# Patient Record
(demographics unavailable — no encounter records)

---

## 2024-11-13 NOTE — HISTORY OF PRESENT ILLNESS
[Spouse] : spouse [Family Member] : family member [FreeTextEntry1] : This visit was provided via telehealth using real-time 2-way audio visual technology. The patient, ELIE BEST was located at home, [address] [place] [address], at the time of the visit.   The provider, Jeevan SOLITARIO, was located at [facility] at the time of the visit.  The patient, ELIE BEST and provider participated in the telehealth encounter.  Verbal consent for telehealth services was given at time prior to the start of visit.  [de-identified] : HFI. This is ELIE BEST 73 year English, F, presented for virtual visit as stated above.  Recently reported vitals in flow sheet. Medication reconciliation performed.  The patient reported h/o: T2DM, HTN, HLD, obesity, carpal tunnel syndrome, crohn disease, djd, gerd, low, right shoulder surgery s/p fall 12/2023, neuropathy,  BMI 34.45, PCP: Kelvin Valles. recent labs 06/24: A1C 8.1, Bun 18, Cret 0.96, eGFR 63, Glu 134 POCT 109. ELIE BEST, is seen via telecommunication as stated above, appears pleasant and in nad.  Spouse is present.  AAO (see PE). Patient denies any feeling of depression, and HI/SI. Patient denies any f/c, sob, cp, dizziness, lightheadedness, abnormal bruising/bleeding, n/v/d, or abdominal pain.  Mental Status: No change in mental status noted.   Cognitive Impairment Screening: denies difficulty with language, denies difficulty with behavior, denies difficulty learning/retaining new information, denies difficulty handling complex tasks, denies difficulty with reasoning and denies difficulty with spatial ability and orientation.   Barriers To Care: None.   Living Situation: lives with significant other.   Employment Status: retired.   Education: less than high school.   Relationship Status:  and has children.   Sexual History: not sexually active.   Home Environment: Feels safe at home.   ADLs: Fully functional (bathing, dressing, toileting, transferring, walking, feeding).   IADLs: Fully functional and needs no help or supervision to perform IADLs (using the telephone, shopping, preparing meals, housekeeping, doing laundry, using transportation, managing medications and managing finances).   Hearing: Reports no changes in hearing.   Vision: Reports no changes in vision. Reports normal functional visual acuity.   Dental Health: Reports no changes in dental health.   Home Safety: smoke detector, carbon monoxide detector and safety elements used in home.   General Safety Concerns:  uses seat belt, uses sunscreen, but does not travel to developing areas, is not being exposed to tuberculosis, does not have caregiver concerns.

## 2024-11-13 NOTE — PLAN
[FreeTextEntry1] : Patient seen in home, enforced w/ pt the need for daily weight/bp monitoring/blood glucose monitoring, low salt diet and educated pt to avoid processed/canned food and limit take out, increase vegetable/fiber and fruit intake (portion control).  Daily exercise w/ easy to reach realistic goals.  Continue w/ current regimen and medication as ordered.  Adhere to follow up w/ pcp/endo/opth/nephro/dpm. Pt advised to call with any questions/concerns.    Total time spent w/ patient 60mins. Discussed POCT monitoring with pt >10 mins.  Depression screening time spent >15 mins.

## 2024-11-13 NOTE — HEALTH RISK ASSESSMENT
[No] : No [Any fall with injury in past year] : Patient reported fall with injury in the past year [Little interest or pleasure doing things] : 1) Little interest or pleasure doing things [Feeling down, depressed, or hopeless] : 2) Feeling down, depressed, or hopeless [0] : 2) Feeling down, depressed, or hopeless: Not at all (0) [PHQ-2 Negative - No further assessment needed] : PHQ-2 Negative - No further assessment needed [Not at All (0)] : 9.) Thoughts that you would be off dead or of hurting yourself in some way? Not at all [PHQ-9 Negative - No further assessment needed] : PHQ-9 Negative - No further assessment needed [I have developed a follow-up plan documented below in the note.] : I have developed a follow-up plan documented below in the note. [Time Spent: ___ Minutes] : I spent [unfilled] minutes performing a depression screening for this patient. [With Patient/Caregiver] : , with patient/caregiver [Designated Healthcare Proxy] : Designated healthcare proxy [Name: ___] : Health Care Proxy's Name: [unfilled]  [Relationship: ___] : Relationship: [unfilled] [I will adhere to the patient's wishes.] : I will adhere to the patient's wishes. [Time Spent: ___ minutes] : Time Spent: [unfilled] minutes [Never] : Never [Audit-CScore] : 0 [[id number]] : 0 [[id number]] : 0 [AdvancecareDate] : 11/24 [FreeTextEntry4] : hcp completed, content not disclosed.

## 2024-11-13 NOTE — PHYSICAL EXAM
[de-identified] : Telehealth precludes traditional, comprehensive physical exam. Patient appeared stable and alert.  virtual video visit w/ objective observation and subject reporting only.

## 2024-11-13 NOTE — REVIEW OF SYSTEMS
[Vision Problems] : vision problems [Joint Pain] : joint pain [Back Pain] : back pain [Negative] : Neurological [FreeTextEntry3] : w/ glasses [FreeTextEntry9] : bilateral knees pain

## 2025-03-11 NOTE — HISTORY OF PRESENT ILLNESS
[FreeTextEntry1] : Annual wellness, General health maintenance for this female of 73 years with history of anxiety depression obesity shoulder pain carpal tunnel hypertension hyperlipidemia impingement of the right shoulder.  CC: is always sweating and hot for the last few months. Gets headaches on her frontal lobe. Also states she has mucous on the right side of her throat, she will be seeing an ENT next week.  Patient has a rash on her toes.  Patient comes for diabetes management [de-identified] : 72 y/o F presents for an annual wellness. Pt has hx of htn, lipids, obesity, diabetes, covid-19 Hx of right shoulder pain, persists, post MRIL massive rotator cuff tear full thickness discontinuity of supraspinatus, infraspinatus and subscapularis. Significant edema within rotator cuff musculature. Bicep tendon complete tear of long head. Severe glenohumeral arthrosis and labrum tear.  Patient denies fever chills cough chest pain or shortness of breath  Voices no further complaints ROS as noted below Denies fever, cough, chills, body aches and SOB.

## 2025-03-11 NOTE — COUNSELING
[Fall prevention counseling provided] : Fall prevention counseling provided [Adequate lighting] : Adequate lighting [No throw rugs] : No throw rugs [Use proper foot wear] : Use proper foot wear [Use recommended devices] : Use recommended devices [Behavioral health counseling provided] : Behavioral health counseling provided [Sleep ___ hours/day] : Sleep [unfilled] hours/day [Engage in a relaxing activity] : Engage in a relaxing activity [Plan in advance] : Plan in advance [Potential consequences of obesity discussed] : Potential consequences of obesity discussed [Benefits of weight loss discussed] : Benefits of weight loss discussed [Structured Weight Management Program suggested:] : Structured weight management program suggested [Encouraged to maintain food diary] : Encouraged to maintain food diary [Encouraged to increase physical activity] : Encouraged to increase physical activity [Encouraged to use exercise tracking device] : Encouraged to use exercise tracking device [Target Wt Loss Goal ___] : Weight Loss Goals: Target weight loss goal [unfilled] lbs [Weigh Self Weekly] : weigh self weekly [Decrease Portions] : decrease portions [____ min/wk Activity] : [unfilled] min/wk activity [Keep Food Diary] : keep food diary [None] : None [Good understanding] : Patient has a good understanding of lifestyle changes and steps needed to achieve self management goal [FreeTextEntry4] : 10-minute [de-identified] : Total face-to-face time with patient - 20 minutes; >50% involved counselling, review of labs/tests, and/or coordination of medical care:  Medical Annual wellness visit completed: HRA completed and reviewed with patient Medical, family, surgical history reviewed with patient and updated List of current providers r/w patient and updated Vitals, BMI reviewed and discussed along with healthy BMI goals. Dietary counseling x 15 minutes provided Depression PHQ 9 completed and reviewed  Annual safety assessment reviewed discussed advanced directives smoking cessation counseling provided Established routine screening and immunization schedules  VACCINATION & OTHER TX RECOMMENDATIONS  ASA preventative therapy Calcium/Vitamin D supplementation   Dietary counseling, nutrition referral risks vs. benefits d/w patient. routine vaccination and vaccination schedules and recommendation d/w patient  Vaccines recommended:  * pneumovax (once after 65) & prevnar * annual Influenza vaccine * Hep B vaccines * zostavax * Tdap  Colorectal screening recommended; screening colonoscopy q10yr, flex sig q5yr, annual fecal occult testing BMD recommended biennially for osteoporosis screening Glaucoma screening recommended, annual optho evals Cardiovascular screening and blood tests recommended and discussed w/ patient, cholesterol screening and dietary counseling AAA recommended x 1  diet and exercise weight loss.  Low-salt low-fat ADA diet/ htn- Discussed diabetes physiology - Discussed importance of monitoring blood glucose levels - Encouraged a low fat/low cholesterol diet - Discussed symptoms of hyperglycemia and hypoglycemia - Discussed ADA glucose goals - Discussed  HGB A1c and the effects of blood glucose on the level - Discussed Healthy eating, avoidance of concentrated sweets, and to include vegetables by at least 2 meals a day - Discussed regular exercise - Discussed importance of follow up physician visits Limit intake of Sodium (Salt) to less than 2 grams a day to prevent fluid retention-swelling or worsening of symptoms. The importance of keeping the blood pressure at or below 130/80 to prevent stroke, heart attacks, kidney failure, blindness, and loss of limbs was  low chol diet. Avoid fried foods, red meat, butter, eggs, hard cheeses. Use canola or olive oil preferred. ::  was established in which goals would be set, monitoring would be done, and problem solving would also be addressed. The patient would be assisted using behavior change techniques, such as self-help and counseling through behavioral modification: Problem solving using hypnosis and positive medical reinforcement to achieve agreed-upon goals.

## 2025-03-11 NOTE — REVIEW OF SYSTEMS
[Negative] : Heme/Lymph [Recent Change In Weight] : ~T recent weight change [Nasal Discharge] : nasal discharge [Joint Pain] : joint pain [Muscle Pain] : muscle pain [Back Pain] : back pain [Skin Rash] : skin rash [Anxiety] : anxiety [FreeTextEntry4] : Postnasal drip, sore throat right side [FreeTextEntry2] : Patient has lost 7 pounds since last visit [de-identified] : Rash on feet

## 2025-03-11 NOTE — COUNSELING
[Fall prevention counseling provided] : Fall prevention counseling provided [Adequate lighting] : Adequate lighting [No throw rugs] : No throw rugs [Use proper foot wear] : Use proper foot wear [Use recommended devices] : Use recommended devices [Behavioral health counseling provided] : Behavioral health counseling provided [Sleep ___ hours/day] : Sleep [unfilled] hours/day [Engage in a relaxing activity] : Engage in a relaxing activity [Plan in advance] : Plan in advance [Potential consequences of obesity discussed] : Potential consequences of obesity discussed [Benefits of weight loss discussed] : Benefits of weight loss discussed [Structured Weight Management Program suggested:] : Structured weight management program suggested [Encouraged to maintain food diary] : Encouraged to maintain food diary [Encouraged to increase physical activity] : Encouraged to increase physical activity [Encouraged to use exercise tracking device] : Encouraged to use exercise tracking device [Target Wt Loss Goal ___] : Weight Loss Goals: Target weight loss goal [unfilled] lbs [Weigh Self Weekly] : weigh self weekly [Decrease Portions] : decrease portions [____ min/wk Activity] : [unfilled] min/wk activity [Keep Food Diary] : keep food diary [None] : None [Good understanding] : Patient has a good understanding of lifestyle changes and steps needed to achieve self management goal [FreeTextEntry4] : 10-minute [de-identified] : Total face-to-face time with patient - 20 minutes; >50% involved counselling, review of labs/tests, and/or coordination of medical care:  Medical Annual wellness visit completed: HRA completed and reviewed with patient Medical, family, surgical history reviewed with patient and updated List of current providers r/w patient and updated Vitals, BMI reviewed and discussed along with healthy BMI goals. Dietary counseling x 15 minutes provided Depression PHQ 9 completed and reviewed  Annual safety assessment reviewed discussed advanced directives smoking cessation counseling provided Established routine screening and immunization schedules  VACCINATION & OTHER TX RECOMMENDATIONS  ASA preventative therapy Calcium/Vitamin D supplementation   Dietary counseling, nutrition referral risks vs. benefits d/w patient. routine vaccination and vaccination schedules and recommendation d/w patient  Vaccines recommended:  * pneumovax (once after 65) & prevnar * annual Influenza vaccine * Hep B vaccines * zostavax * Tdap  Colorectal screening recommended; screening colonoscopy q10yr, flex sig q5yr, annual fecal occult testing BMD recommended biennially for osteoporosis screening Glaucoma screening recommended, annual optho evals Cardiovascular screening and blood tests recommended and discussed w/ patient, cholesterol screening and dietary counseling AAA recommended x 1  diet and exercise weight loss.  Low-salt low-fat ADA diet/ htn- Discussed diabetes physiology - Discussed importance of monitoring blood glucose levels - Encouraged a low fat/low cholesterol diet - Discussed symptoms of hyperglycemia and hypoglycemia - Discussed ADA glucose goals - Discussed  HGB A1c and the effects of blood glucose on the level - Discussed Healthy eating, avoidance of concentrated sweets, and to include vegetables by at least 2 meals a day - Discussed regular exercise - Discussed importance of follow up physician visits Limit intake of Sodium (Salt) to less than 2 grams a day to prevent fluid retention-swelling or worsening of symptoms. The importance of keeping the blood pressure at or below 130/80 to prevent stroke, heart attacks, kidney failure, blindness, and loss of limbs was  low chol diet. Avoid fried foods, red meat, butter, eggs, hard cheeses. Use canola or olive oil preferred. ::  was established in which goals would be set, monitoring would be done, and problem solving would also be addressed. The patient would be assisted using behavior change techniques, such as self-help and counseling through behavioral modification: Problem solving using hypnosis and positive medical reinforcement to achieve agreed-upon goals.

## 2025-03-11 NOTE — DATA REVIEWED
[FreeTextEntry1] :  Blood work done in February by endocrinologist revealed normal lipid panel glucose 150 teen A1c 8.3 average blood sugar 192, EKG sinus rhythm

## 2025-03-11 NOTE — REASON FOR VISIT
[Annual Wellness Visit] : an annual wellness visit [FreeTextEntry1] : Annual wellness for this 73-year-old female

## 2025-03-11 NOTE — HEALTH RISK ASSESSMENT
[Fair] :  ~his/her~ mood as fair [No] : No [No falls in past year] : Patient reported no falls in the past year [0] : 2) Feeling down, depressed, or hopeless: Not at all (0) [Never] : Never [NO] : No [With Significant Other] : lives with significant other [Retired] : retired [Less Than High School] : less than high school [] :  [# Of Children ___] : has [unfilled] children [Feels Safe at Home] : Feels safe at home [Fully functional (bathing, dressing, toileting, transferring, walking, feeding)] : Fully functional (bathing, dressing, toileting, transferring, walking, feeding) [Fully functional (using the telephone, shopping, preparing meals, housekeeping, doing laundry, using] : Fully functional and needs no help or supervision to perform IADLs (using the telephone, shopping, preparing meals, housekeeping, doing laundry, using transportation, managing medications and managing finances) [Reports normal functional visual acuity (ie: able to read med bottle)] : Reports normal functional visual acuity [Smoke Detector] : smoke detector [Carbon Monoxide Detector] : carbon monoxide detector [Safety elements used in home] : safety elements used in home [Seat Belt] :  uses seat belt [Sunscreen] : uses sunscreen [Reviewed no changes] : Reviewed, no changes [Designated Healthcare Proxy] : Designated healthcare proxy [Name: ___] : Health Care Proxy's Name: [unfilled]  [Relationship: ___] : Relationship: [unfilled] [Aggressive treatment] : aggressive treatment [I will adhere to the patient's wishes.] : I will adhere to the patient's wishes. [Time Spent: ___ minutes] : Time Spent: [unfilled] minutes [PHQ-2 Negative - No further assessment needed] : PHQ-2 Negative - No further assessment needed [I have developed a follow-up plan documented below in the note.] : I have developed a follow-up plan documented below in the note. [Time Spent: ___ Minutes] : I spent [unfilled] minutes performing a depression screening for this patient. [Patient reported colonoscopy was normal] : Patient reported colonoscopy was normal [Yes] : takes [None] : Patient does not have any barriers to medication adherence [Audit-CScore] : 0 [de-identified] : Minimal [de-identified] : Standard [[id number]] : 0 [Change in mental status noted] : No change in mental status noted [Language] : denies difficulty with language [Behavior] : denies difficulty with behavior [Learning/Retaining New Information] : denies difficulty learning/retaining new information [Handling Complex Tasks] : denies difficulty handling complex tasks [Reasoning] : denies difficulty with reasoning [Spatial Ability and Orientation] : denies difficulty with spatial ability and orientation [Sexually Active] : not sexually active [Reports changes in hearing] : Reports no changes in hearing [Reports changes in vision] : Reports no changes in vision [Reports changes in dental health] : Reports no changes in dental health [Travel to Developing Areas] : does not  travel to developing areas [TB Exposure] : is not being exposed to tuberculosis [Caregiver Concerns] : does not have caregiver concerns [MammogramComments] : Recommended [BoneDensityComments] : Recommended [ColonoscopyDate] : 02/25 [AdvancecareDate] : 03/25

## 2025-03-11 NOTE — END OF VISIT
[Time Spent: ___ minutes] : I have spent [unfilled] minutes of time on the encounter which excludes teaching and separately reported services. [FreeTextEntry4] : I Shannon Preston, am scribing for and in the presence of Dr. Valles, the following sections of:  HISTORY OF PRESENT ILLNESS, PAST MEDICAL/FAMILY/SOCIAL HISTORY, ROS, VITALS, PE, DISPOSITION

## 2025-03-11 NOTE — HEALTH RISK ASSESSMENT
[Fair] :  ~his/her~ mood as fair [No] : No [No falls in past year] : Patient reported no falls in the past year [0] : 2) Feeling down, depressed, or hopeless: Not at all (0) [Never] : Never [NO] : No [With Significant Other] : lives with significant other [Retired] : retired [Less Than High School] : less than high school [] :  [# Of Children ___] : has [unfilled] children [Feels Safe at Home] : Feels safe at home [Fully functional (bathing, dressing, toileting, transferring, walking, feeding)] : Fully functional (bathing, dressing, toileting, transferring, walking, feeding) [Fully functional (using the telephone, shopping, preparing meals, housekeeping, doing laundry, using] : Fully functional and needs no help or supervision to perform IADLs (using the telephone, shopping, preparing meals, housekeeping, doing laundry, using transportation, managing medications and managing finances) [Reports normal functional visual acuity (ie: able to read med bottle)] : Reports normal functional visual acuity [Smoke Detector] : smoke detector [Carbon Monoxide Detector] : carbon monoxide detector [Safety elements used in home] : safety elements used in home [Seat Belt] :  uses seat belt [Sunscreen] : uses sunscreen [Reviewed no changes] : Reviewed, no changes [Designated Healthcare Proxy] : Designated healthcare proxy [Name: ___] : Health Care Proxy's Name: [unfilled]  [Relationship: ___] : Relationship: [unfilled] [Aggressive treatment] : aggressive treatment [I will adhere to the patient's wishes.] : I will adhere to the patient's wishes. [Time Spent: ___ minutes] : Time Spent: [unfilled] minutes [PHQ-2 Negative - No further assessment needed] : PHQ-2 Negative - No further assessment needed [I have developed a follow-up plan documented below in the note.] : I have developed a follow-up plan documented below in the note. [Time Spent: ___ Minutes] : I spent [unfilled] minutes performing a depression screening for this patient. [Patient reported colonoscopy was normal] : Patient reported colonoscopy was normal [Yes] : takes [None] : Patient does not have any barriers to medication adherence [Audit-CScore] : 0 [de-identified] : Minimal [de-identified] : Standard [CSO2Blfvx] : 0 [Change in mental status noted] : No change in mental status noted [Language] : denies difficulty with language [Behavior] : denies difficulty with behavior [Learning/Retaining New Information] : denies difficulty learning/retaining new information [Handling Complex Tasks] : denies difficulty handling complex tasks [Reasoning] : denies difficulty with reasoning [Spatial Ability and Orientation] : denies difficulty with spatial ability and orientation [Sexually Active] : not sexually active [Reports changes in hearing] : Reports no changes in hearing [Reports changes in vision] : Reports no changes in vision [Reports changes in dental health] : Reports no changes in dental health [Travel to Developing Areas] : does not  travel to developing areas [TB Exposure] : is not being exposed to tuberculosis [Caregiver Concerns] : does not have caregiver concerns [MammogramComments] : Recommended [BoneDensityComments] : Recommended [ColonoscopyDate] : 02/25 [AdvancecareDate] : 03/25

## 2025-03-11 NOTE — REVIEW OF SYSTEMS
[Negative] : Heme/Lymph [Recent Change In Weight] : ~T recent weight change [Nasal Discharge] : nasal discharge [Joint Pain] : joint pain [Muscle Pain] : muscle pain [Back Pain] : back pain [Skin Rash] : skin rash [Anxiety] : anxiety [FreeTextEntry2] : Patient has lost 7 pounds since last visit [FreeTextEntry4] : Postnasal drip, sore throat right side [de-identified] : Rash on feet

## 2025-03-11 NOTE — PHYSICAL EXAM
[No Acute Distress] : no acute distress [Well Nourished] : well nourished [Well Developed] : well developed [Well-Appearing] : well-appearing [Normal Sclera/Conjunctiva] : normal sclera/conjunctiva [PERRL] : pupils equal round and reactive to light [EOMI] : extraocular movements intact [Normal Outer Ear/Nose] : the outer ears and nose were normal in appearance [Normal Oropharynx] : the oropharynx was normal [No JVD] : no jugular venous distention [No Lymphadenopathy] : no lymphadenopathy [Supple] : supple [Thyroid Normal, No Nodules] : the thyroid was normal and there were no nodules present [No Respiratory Distress] : no respiratory distress  [No Accessory Muscle Use] : no accessory muscle use [Clear to Auscultation] : lungs were clear to auscultation bilaterally [Normal Rate] : normal rate  [Regular Rhythm] : with a regular rhythm [Normal S1, S2] : normal S1 and S2 [No Murmur] : no murmur heard [No Carotid Bruits] : no carotid bruits [No Abdominal Bruit] : a ~M bruit was not heard ~T in the abdomen [No Varicosities] : no varicosities [Pedal Pulses Present] : the pedal pulses are present [No Edema] : there was no peripheral edema [No Palpable Aorta] : no palpable aorta [No Extremity Clubbing/Cyanosis] : no extremity clubbing/cyanosis [Declined Breast Exam] : declined breast exam  [Soft] : abdomen soft [Non Tender] : non-tender [Non-distended] : non-distended [No Masses] : no abdominal mass palpated [No HSM] : no HSM [Normal Bowel Sounds] : normal bowel sounds [Normal Posterior Cervical Nodes] : no posterior cervical lymphadenopathy [Normal Anterior Cervical Nodes] : no anterior cervical lymphadenopathy [No CVA Tenderness] : no CVA  tenderness [No Spinal Tenderness] : no spinal tenderness [No Joint Swelling] : no joint swelling [Grossly Normal Strength/Tone] : grossly normal strength/tone [No Rash] : no rash [Coordination Grossly Intact] : coordination grossly intact [No Focal Deficits] : no focal deficits [Normal Gait] : normal gait [Deep Tendon Reflexes (DTR)] : deep tendon reflexes were 2+ and symmetric [Normal Affect] : the affect was normal [Normal Insight/Judgement] : insight and judgment were intact [Normal] : no joint swelling and grossly normal strength and tone [Alert and Oriented x3] : oriented to person, place, and time [de-identified] : BMI 33.7 [de-identified] : Nasal congestion pustule noted right. [de-identified] : No flank pain [de-identified] : Jules mycotic appearing rash on great toes and in between toes [de-identified] : Anxious

## 2025-03-11 NOTE — HISTORY OF PRESENT ILLNESS
[FreeTextEntry1] : Annual wellness, General health maintenance for this female of 73 years with history of anxiety depression obesity shoulder pain carpal tunnel hypertension hyperlipidemia impingement of the right shoulder.  CC: is always sweating and hot for the last few months. Gets headaches on her frontal lobe. Also states she has mucous on the right side of her throat, she will be seeing an ENT next week.  Patient has a rash on her toes.  Patient comes for diabetes management [de-identified] : 74 y/o F presents for an annual wellness. Pt has hx of htn, lipids, obesity, diabetes, covid-19 Hx of right shoulder pain, persists, post MRIL massive rotator cuff tear full thickness discontinuity of supraspinatus, infraspinatus and subscapularis. Significant edema within rotator cuff musculature. Bicep tendon complete tear of long head. Severe glenohumeral arthrosis and labrum tear.  Patient denies fever chills cough chest pain or shortness of breath  Voices no further complaints ROS as noted below Denies fever, cough, chills, body aches and SOB.

## 2025-03-11 NOTE — PHYSICAL EXAM
[No Acute Distress] : no acute distress [Well Nourished] : well nourished [Well Developed] : well developed [Well-Appearing] : well-appearing [Normal Sclera/Conjunctiva] : normal sclera/conjunctiva [PERRL] : pupils equal round and reactive to light [EOMI] : extraocular movements intact [Normal Outer Ear/Nose] : the outer ears and nose were normal in appearance [Normal Oropharynx] : the oropharynx was normal [No JVD] : no jugular venous distention [No Lymphadenopathy] : no lymphadenopathy [Supple] : supple [Thyroid Normal, No Nodules] : the thyroid was normal and there were no nodules present [No Respiratory Distress] : no respiratory distress  [No Accessory Muscle Use] : no accessory muscle use [Clear to Auscultation] : lungs were clear to auscultation bilaterally [Normal Rate] : normal rate  [Regular Rhythm] : with a regular rhythm [Normal S1, S2] : normal S1 and S2 [No Murmur] : no murmur heard [No Carotid Bruits] : no carotid bruits [No Abdominal Bruit] : a ~M bruit was not heard ~T in the abdomen [No Varicosities] : no varicosities [Pedal Pulses Present] : the pedal pulses are present [No Edema] : there was no peripheral edema [No Palpable Aorta] : no palpable aorta [No Extremity Clubbing/Cyanosis] : no extremity clubbing/cyanosis [Declined Breast Exam] : declined breast exam  [Soft] : abdomen soft [Non Tender] : non-tender [Non-distended] : non-distended [No Masses] : no abdominal mass palpated [No HSM] : no HSM [Normal Bowel Sounds] : normal bowel sounds [Normal Posterior Cervical Nodes] : no posterior cervical lymphadenopathy [Normal Anterior Cervical Nodes] : no anterior cervical lymphadenopathy [No CVA Tenderness] : no CVA  tenderness [No Spinal Tenderness] : no spinal tenderness [No Joint Swelling] : no joint swelling [Grossly Normal Strength/Tone] : grossly normal strength/tone [No Rash] : no rash [Coordination Grossly Intact] : coordination grossly intact [No Focal Deficits] : no focal deficits [Normal Gait] : normal gait [Deep Tendon Reflexes (DTR)] : deep tendon reflexes were 2+ and symmetric [Normal Affect] : the affect was normal [Normal Insight/Judgement] : insight and judgment were intact [Normal] : no joint swelling and grossly normal strength and tone [Alert and Oriented x3] : oriented to person, place, and time [de-identified] : BMI 33.7 [de-identified] : Nasal congestion pustule noted right. [de-identified] : No flank pain [de-identified] : Jules mycotic appearing rash on great toes and in between toes [de-identified] : Anxious

## 2025-05-12 NOTE — HISTORY OF PRESENT ILLNESS
[FreeTextEntry1] : Patient with moderately controlled type 2 diabetes currently back on metformin 500 mg 2 tablets a day, and Jardiance 25 mg which she has stopped due to vaginal itching but did resume. On Ozempic 0.5 mg recently increased by her PCP to 1 mg which she did not start yet.  We had initially lowered it due to c/o loose BMs.  She has been overweight does believe she eats healthfully and has   lost some weight. She is on medication for hypertension and hyperlipidemia. BP med was recently increased by her PCP.. She does have significant Reflux symptoms and is seeing a gastroenterologist. she was started on Amitesa. She does not think Bydureon or now Ozempic has worsened the symptoms. She was given medication for a GI candida infection as per patient. She had a cholecystectomy last July and still has some abdominal discomfort. Has f/u with GI. Having physical therapy after having had right shoulder surgery. C/O creeping crawling sensation on back.  No rash.  Saw neurologist and was given Cymbalta which she said has not helped.

## 2025-05-12 NOTE — ASSESSMENT
[Diabetes Foot Care] : diabetes foot care [Long Term Vascular Complications] : long term vascular complications of diabetes [Carbohydrate Consistent Diet] : carbohydrate consistent diet [Importance of Diet and Exercise] : importance of diet and exercise to improve glycemic control, achieve weight loss and improve cardiovascular health [Hypoglycemia Management] : hypoglycemia management [Self Monitoring of Blood Glucose] : self monitoring of blood glucose [Retinopathy Screening] : Patient was referred to ophthalmology for retinopathy screening [Diabetic Medications] : Risks and benefits of diabetic medications were discussed [FreeTextEntry1] : Patient with moderately controlled type 2 diabetes prior A1C 7.2 then 8.6 since off metformin.  For now will remain on metformin 500 mg 2 tablets a day we did change from extended release to immediate release because she was complaining of the smell.  May need to go back to extended release if stomach is upset.. Continue Jardiance but make sure taking 25 mg dose and Ozempic 1 mg.  Will call is any GI issues that are being aggravated.   Encourage healthy lifestyle including a low carb diet and exercise as tolerated. Monitor blood sugars as directed and bring meter to all visits. In terms of the unusual sensations on her body I suggested she stay well-hydrated and when we do labs we will check her magnesium level.  Will call for labs done at her PCP.

## 2025-05-12 NOTE — PHYSICAL EXAM
[Alert] : alert [No Acute Distress] : no acute distress [Normal Voice/Communication] : normal voice communication [Normal Sclera/Conjunctiva] : normal sclera/conjunctiva [Normal Outer Ear/Nose] : the ears and nose were normal in appearance [No Neck Mass] : no neck mass was observed [Thyroid Not Enlarged] : the thyroid was not enlarged [No Thyroid Nodules] : no palpable thyroid nodules [No Respiratory Distress] : no respiratory distress [Clear to Auscultation] : lungs were clear to auscultation bilaterally [Normal to Percussion] : lungs were normal to percussion [No Edema] : no peripheral edema [Normal Bowel Sounds] : normal bowel sounds [Not Tender] : non-tender [Soft] : abdomen soft [No CVA Tenderness] : no ~M costovertebral angle tenderness [No Stigmata of Cushings Syndrome] : no stigmata of Cushings Syndrome [Oriented x3] : oriented to person, place, and time [Normal Insight/Judgement] : insight and judgment were intact

## 2025-05-16 NOTE — HEALTH RISK ASSESSMENT
[No] : No [No falls in past year] : Patient reported no falls in the past year [0] : 2) Feeling down, depressed, or hopeless: Not at all (0) [PHQ-2 Negative - No further assessment needed] : PHQ-2 Negative - No further assessment needed [I have developed a follow-up plan documented below in the note.] : I have developed a follow-up plan documented below in the note. [Time Spent: ___ Minutes] : I spent [unfilled] minutes performing a depression screening for this patient. [With Patient/Caregiver] : , with patient/caregiver [Designated Healthcare Proxy] : Designated healthcare proxy [Name: ___] : Health Care Proxy's Name: [unfilled]  [Relationship: ___] : Relationship: [unfilled] [I will adhere to the patient's wishes.] : I will adhere to the patient's wishes. [Time Spent: ___ minutes] : Time Spent: [unfilled] minutes [Never] : Never [Not at All (0)] : 9.) Thoughts that you would be off dead or of hurting yourself in some way? Not at all [PHQ-9 Negative - No further assessment needed] : PHQ-9 Negative - No further assessment needed [Audit-CScore] : 0 [[id number]] : 0 [[id number]] : 0 [AdvancecareDate] : 05/25 [FreeTextEntry4] : hcp completed, content not disclosed.

## 2025-05-16 NOTE — HISTORY OF PRESENT ILLNESS
[Spouse] : spouse [FreeTextEntry1] : This visit was provided via telehealth using real-time 2-way audio visual technology. The patient, ELIE BEST was located at home, [address] [place] [address], at the time of the visit.   The provider, Jeevan SOLITARIO[facility].  The patient, ELIE BEST and provider participated in the telehealth encounter.  Verbal consent for telehealth services was given at time prior to the start of visit.  [de-identified] : HFI. This is ELIE BEST 73 year English, F, presented for virtual visit as stated above.  Recently reported vitals in flow sheet. Medication reconciliation performed.  The patient reported h/o: htn, lipids, obesity, diabetes, right shoulder pain, post MRIL massive rotator cuff tear full thickness discontinuity of supraspinatus, infraspinatus and subscapularis.   BMI 33.28, PCP Reena Warren ELIE BEST, is seen via telecommunication as stated above, appears pleasant and in nad.  Spouse is present. Has active home care services, w/ [facility] scheduled: [facility] (see PE). Patient denies any feeling of depression, and HI/SI. Patient denies any f/c, sob, cp, dizziness, lightheadedness, abnormal bruising/bleeding, n/v/d, or abdominal pain.  Mammogram: Recommended.   Bone Density: Recommended.   Colonoscopy: 02/25. Patient reported colonoscopy was normal.   Mental Status: No change in mental status noted.   Cognitive Impairment Screening: denies difficulty with language, denies difficulty with behavior, denies difficulty learning/retaining new information, denies difficulty handling complex tasks, denies difficulty with reasoning and denies difficulty with spatial ability and orientation.   Barriers To Care: None.    Living Situation: lives with significant other.   Employment Status: retired.   Education: less than high school.   Relationship Status:  and has children.   Sexual History: not sexually active.   Home Environment: Feels safe at home.   ADLs: Fully functional (bathing, dressing, toileting, transferring, walking, feeding).   IADLs: Fully functional and needs no help or supervision to perform IADLs (using the telephone, shopping, preparing meals, housekeeping, doing laundry, using transportation, managing medications and managing finances).   Hearing: Reports no changes in hearing.   Vision: Reports no changes in vision. Reports normal functional visual acuity.   Dental Health: Reports no changes in dental health.   Home Safety: smoke detector, carbon monoxide detector and safety elements used in home.   General Safety Concerns:  uses seat belt, uses sunscreen, but does not travel to developing areas, is not being exposed to tuberculosis, does not have caregiver concerns.

## 2025-05-16 NOTE — PHYSICAL EXAM
[de-identified] : Telehealth precludes traditional, comprehensive physical exam. Patient appeared stable and alert.  virtual video visit w/ objective observation and subject reporting only.

## 2025-05-16 NOTE — HISTORY OF PRESENT ILLNESS
[Spouse] : spouse [FreeTextEntry1] : This visit was provided via telehealth using real-time 2-way audio visual technology. The patient, ELIE BEST was located at home, [address] [place] [address], at the time of the visit.   The provider, Jeevan SOLITARIO, was located at [facility] at the time of the visit.  The patient, ELIE BEST and provider participated in the telehealth encounter.  Verbal consent for telehealth services was given at time prior to the start of visit.  [de-identified] : HFI. This is ELIE BEST 73 year English, F, presented for virtual visit as stated above.  Recently reported vitals in flow sheet. Medication reconciliation performed.  The patient reported h/o: htn, lipids, obesity, diabetes, right shoulder pain, post MRIL massive rotator cuff tear full thickness discontinuity of supraspinatus, infraspinatus and subscapularis.   BMI 33.28, PCP Reena Warren ELIE BEST, is seen via telecommunication as stated above, appears pleasant and in nad.  Spouse is present. Has active home care services, w/ [facility]HA scheduled: [facility] (see PE). Patient denies any feeling of depression, and HI/SI. Patient denies any f/c, sob, cp, dizziness, lightheadedness, abnormal bruising/bleeding, n/v/d, or abdominal pain.  Mammogram: Recommended.   Bone Density: Recommended.   Colonoscopy: 02/25. Patient reported colonoscopy was normal.   Mental Status: No change in mental status noted.   Cognitive Impairment Screening: denies difficulty with language, denies difficulty with behavior, denies difficulty learning/retaining new information, denies difficulty handling complex tasks, denies difficulty with reasoning and denies difficulty with spatial ability and orientation.   Barriers To Care: None.    Living Situation: lives with significant other.   Employment Status: retired.   Education: less than high school.   Relationship Status:  and has children.   Sexual History: not sexually active.   Home Environment: Feels safe at home.   ADLs: Fully functional (bathing, dressing, toileting, transferring, walking, feeding).   IADLs: Fully functional and needs no help or supervision to perform IADLs (using the telephone, shopping, preparing meals, housekeeping, doing laundry, using transportation, managing medications and managing finances).   Hearing: Reports no changes in hearing.   Vision: Reports no changes in vision. Reports normal functional visual acuity.   Dental Health: Reports no changes in dental health.   Home Safety: smoke detector, carbon monoxide detector and safety elements used in home.   General Safety Concerns:  uses seat belt, uses sunscreen, but does not travel to developing areas, is not being exposed to tuberculosis, does not have caregiver concerns.

## 2025-05-16 NOTE — PHYSICAL EXAM
[de-identified] : Telehealth precludes traditional, comprehensive physical exam. Patient appeared stable and alert.  virtual video visit w/ objective observation and subject reporting only.

## 2025-05-16 NOTE — HEALTH RISK ASSESSMENT
[No] : No [No falls in past year] : Patient reported no falls in the past year [0] : 2) Feeling down, depressed, or hopeless: Not at all (0) [PHQ-2 Negative - No further assessment needed] : PHQ-2 Negative - No further assessment needed [I have developed a follow-up plan documented below in the note.] : I have developed a follow-up plan documented below in the note. [Time Spent: ___ Minutes] : I spent [unfilled] minutes performing a depression screening for this patient. [With Patient/Caregiver] : , with patient/caregiver [Designated Healthcare Proxy] : Designated healthcare proxy [Name: ___] : Health Care Proxy's Name: [unfilled]  [Relationship: ___] : Relationship: [unfilled] [I will adhere to the patient's wishes.] : I will adhere to the patient's wishes. [Time Spent: ___ minutes] : Time Spent: [unfilled] minutes [Never] : Never [Not at All (0)] : 9.) Thoughts that you would be off dead or of hurting yourself in some way? Not at all [PHQ-9 Negative - No further assessment needed] : PHQ-9 Negative - No further assessment needed [Audit-CScore] : 0 [WHG3Teeuz] : 0 [IVZ5DdhqsFooys] : 0 [AdvancecareDate] : 05/25 [FreeTextEntry4] : hcp completed, content not disclosed.

## 2025-06-28 NOTE — PHYSICAL EXAM
[No Acute Distress] : no acute distress [Well Nourished] : well nourished [Well Developed] : well developed [Well-Appearing] : well-appearing [Normal Sclera/Conjunctiva] : normal sclera/conjunctiva [PERRL] : pupils equal round and reactive to light [EOMI] : extraocular movements intact [Normal Outer Ear/Nose] : the outer ears and nose were normal in appearance [Normal Oropharynx] : the oropharynx was normal [No JVD] : no jugular venous distention [No Lymphadenopathy] : no lymphadenopathy [Supple] : supple [Thyroid Normal, No Nodules] : the thyroid was normal and there were no nodules present [No Respiratory Distress] : no respiratory distress  [No Accessory Muscle Use] : no accessory muscle use [Clear to Auscultation] : lungs were clear to auscultation bilaterally [Normal Rate] : normal rate  [Regular Rhythm] : with a regular rhythm [Normal S1, S2] : normal S1 and S2 [No Murmur] : no murmur heard [No Carotid Bruits] : no carotid bruits [No Abdominal Bruit] : a ~M bruit was not heard ~T in the abdomen [No Varicosities] : no varicosities [Pedal Pulses Present] : the pedal pulses are present [No Edema] : there was no peripheral edema [No Palpable Aorta] : no palpable aorta [No Extremity Clubbing/Cyanosis] : no extremity clubbing/cyanosis [Declined Breast Exam] : declined breast exam  [Soft] : abdomen soft [Non Tender] : non-tender [Non-distended] : non-distended [No Masses] : no abdominal mass palpated [No HSM] : no HSM [Normal Bowel Sounds] : normal bowel sounds [Declined Rectal Exam] : declined rectal exam [Normal Posterior Cervical Nodes] : no posterior cervical lymphadenopathy [Normal Anterior Cervical Nodes] : no anterior cervical lymphadenopathy [No CVA Tenderness] : no CVA  tenderness [No Spinal Tenderness] : no spinal tenderness [No Joint Swelling] : no joint swelling [Grossly Normal Strength/Tone] : grossly normal strength/tone [No Rash] : no rash [Coordination Grossly Intact] : coordination grossly intact [No Focal Deficits] : no focal deficits [Normal Gait] : normal gait [Deep Tendon Reflexes (DTR)] : deep tendon reflexes were 2+ and symmetric [Normal Affect] : the affect was normal [Normal Insight/Judgement] : insight and judgment were intact [Normal] : affect was normal and insight and judgment were intact [Alert and Oriented x3] : oriented to person, place, and time [de-identified] : BMI 34 [de-identified] : Spurling positive stiffness pain right side [de-identified] : Admits to galactorrhea [de-identified] : No flank pain [de-identified] : Patient has low back pain neck pain Spurling positive straight leg raise positive bilaterally. [de-identified] : Anxious appears mildly depressed

## 2025-06-28 NOTE — REASON FOR VISIT
[Annual Wellness Visit] : an annual wellness visit [FreeTextEntry1] : Annual wellness/medical clearance for this 72-year-old female

## 2025-06-28 NOTE — COUNSELING
[Fall prevention counseling provided] : Fall prevention counseling provided [Adequate lighting] : Adequate lighting [No throw rugs] : No throw rugs [Use proper foot wear] : Use proper foot wear [Use recommended devices] : Use recommended devices [Behavioral health counseling provided] : Behavioral health counseling provided [Sleep ___ hours/day] : Sleep [unfilled] hours/day [Engage in a relaxing activity] : Engage in a relaxing activity [Plan in advance] : Plan in advance [Potential consequences of obesity discussed] : Potential consequences of obesity discussed [Benefits of weight loss discussed] : Benefits of weight loss discussed [Structured Weight Management Program suggested:] : Structured weight management program suggested [Encouraged to maintain food diary] : Encouraged to maintain food diary [Encouraged to increase physical activity] : Encouraged to increase physical activity [Encouraged to use exercise tracking device] : Encouraged to use exercise tracking device [Target Wt Loss Goal ___] : Weight Loss Goals: Target weight loss goal [unfilled] lbs [Weigh Self Weekly] : weigh self weekly [Decrease Portions] : decrease portions [____ min/wk Activity] : [unfilled] min/wk activity [Keep Food Diary] : keep food diary [None] : None [Good understanding] : Patient has a good understanding of lifestyle changes and steps needed to achieve self management goal [FreeTextEntry4] : 10-minute [de-identified] : Total face-to-face time with patient - 15 minutes; >50% involved counselling, review of labs/tests, and/or coordination of medical care: Dscd.D/E wt.loss needs to loose 10% TBW.DSCD.self monitoring, goal setting,problem solving w-b mod.portion control, avoidence of skipping meals, high glycemic foods,snacking and mindless eating in front of the tv.Suggested use of small plates and not keepingall of the food on the dinner table and leaving it at the stove top.Pt was also told to calorie count and an nadeen was recommended DSCD exercising 20 minutes per day:dscd:med /pharmaco bariatric tx options.   - Encouraged a low fat/low cholesterol diet  - Discussed Healthy eating, avoidance of concentrated sweets, and to include vegetables by at least 3 meals a day  - encouraged low glycemic fruits, grains and vegetables and a diet high in plant protein  - Discussed regular exercise  - Discussed importance of follow up physician visits  Bp stable, continue with medications, dash diet, exercise, and dietary management.Continue to check home Bps.  - Discussed diabetes physiology - Discussed importance of monitoring blood glucose levels - Encouraged a low fat/low cholesterol diet - Discussed symptoms of hyperglycemia and hypoglycemia - Discussed ADA glucose goals - Discussed  HGB A1c and the effects of blood glucose on the level - Discussed Healthy eating, avoidance of concentrated sweets, and to include vegetables by at least 2 meals a day - Discussed regular exercise - Discussed importance of follow up physician visits take medications as advised. Rest, ice/heat massage therapy/myofascial  release no heavy lift or twisting avoid prolonged positioning. We reviewed proper lifting mechanics continue with a home stretching program. Return to office in 2 weeks  Avoid tomato products, mint, citrus (drinks / fruit), fried fatty foods, caffeine, chocolate.Avoid food / drink ,2 hours prior to bedtime / napping:  Advised to take 1500mg per day of elemental calcium and 1,000 iu of vitamin D3, as well as a minimum of 3 hours per week of weight baring exercise.Repeat Bone Density in 2 years, as protocol indicates:  - Discussed diabetes physiology - Discussed importance of monitoring blood glucose levels - Encouraged a low fat/low cholesterol diet - Discussed symptoms of hyperglycemia and hypoglycemia - Discussed ADA glucose goals - Discussed  HGB A1c and the effects of blood glucose on the level - Discussed Healthy eating, avoidance of concentrated sweets, and to include vegetables by at least 2 meals a day - Discussed regular exercise - Discussed importance of follow up physician visits diet and exercise weight loss.  Low-salt low-fat ADA diet/ htn- Discussed diabetes physiology - Discussed importance of monitoring blood glucose levels - Encouraged a low fat/low cholesterol diet - Discussed symptoms of hyperglycemia and hypoglycemia - Discussed ADA glucose goals - Discussed  HGB A1c and the effects of blood glucose on the level - Discussed Healthy eating, avoidance of concentrated sweets, and to include vegetables by at least 2 meals a day - Discussed regular exercise - Discussed importance of follow up physician visits Limit intake of Sodium (Salt) to less than 2 grams a day to prevent fluid retention-swelling or worsening of symptoms. The importance of keeping the blood pressure at or below 130/80 to prevent stroke, heart attacks, kidney failure, blindness, and loss of limbs was  low chol diet. Avoid fried foods, red meat, butter, eggs, hard cheeses. Use canola or olive oil preferred. ::  was established in which goals would be set, monitoring would be done, and problem solving would also be addressed. The patient would be assisted using behavior change techniques, such as self-help and counseling through behavioral modification: Problem solving using hypnosis and positive medical reinforcement to achieve agreed-upon goals.  low chol diet. Avoid fried foods, red meat, butter, eggs, hard cheeses. Use canola or olive oil preferred.   - Encouraged a low fat/low cholesterol diet  - Discussed Healthy eating, avoidance of concentrated sweets, and to include vegetables by at least 3 meals a day  - encouraged low glycemic fruits, grains and vegetables and a diet high in plant protein  - Discussed regular exercise  - Discussed importance of follow up physician visits

## 2025-06-28 NOTE — REVIEW OF SYSTEMS
[Negative] : Heme/Lymph [Nasal Discharge] : nasal discharge [Nausea] : nausea [Heartburn] : heartburn [Joint Pain] : joint pain [Muscle Pain] : muscle pain [Back Pain] : no back pain [Unsteady Walking] : ataxia [Anxiety] : anxiety [Depression] : depression [FreeTextEntry4] : Chronic postnasal drip [FreeTextEntry1] : Patient complains of left breast galactorrhea

## 2025-06-28 NOTE — COUNSELING
[Fall prevention counseling provided] : Fall prevention counseling provided [Adequate lighting] : Adequate lighting [No throw rugs] : No throw rugs [Use proper foot wear] : Use proper foot wear [Use recommended devices] : Use recommended devices [Behavioral health counseling provided] : Behavioral health counseling provided [Sleep ___ hours/day] : Sleep [unfilled] hours/day [Engage in a relaxing activity] : Engage in a relaxing activity [Plan in advance] : Plan in advance [Potential consequences of obesity discussed] : Potential consequences of obesity discussed [Benefits of weight loss discussed] : Benefits of weight loss discussed [Structured Weight Management Program suggested:] : Structured weight management program suggested [Encouraged to maintain food diary] : Encouraged to maintain food diary [Encouraged to increase physical activity] : Encouraged to increase physical activity [Encouraged to use exercise tracking device] : Encouraged to use exercise tracking device [Target Wt Loss Goal ___] : Weight Loss Goals: Target weight loss goal [unfilled] lbs [Weigh Self Weekly] : weigh self weekly [Decrease Portions] : decrease portions [____ min/wk Activity] : [unfilled] min/wk activity [Keep Food Diary] : keep food diary [None] : None [Good understanding] : Patient has a good understanding of lifestyle changes and steps needed to achieve self management goal [FreeTextEntry4] : 10-minute [de-identified] : Total face-to-face time with patient - 15 minutes; >50% involved counselling, review of labs/tests, and/or coordination of medical care: Dscd.D/E wt.loss needs to loose 10% TBW.DSCD.self monitoring, goal setting,problem solving w-b mod.portion control, avoidence of skipping meals, high glycemic foods,snacking and mindless eating in front of the tv.Suggested use of small plates and not keepingall of the food on the dinner table and leaving it at the stove top.Pt was also told to calorie count and an nadeen was recommended DSCD exercising 20 minutes per day:dscd:med /pharmaco bariatric tx options.   - Encouraged a low fat/low cholesterol diet  - Discussed Healthy eating, avoidance of concentrated sweets, and to include vegetables by at least 3 meals a day  - encouraged low glycemic fruits, grains and vegetables and a diet high in plant protein  - Discussed regular exercise  - Discussed importance of follow up physician visits  Bp stable, continue with medications, dash diet, exercise, and dietary management.Continue to check home Bps.  - Discussed diabetes physiology - Discussed importance of monitoring blood glucose levels - Encouraged a low fat/low cholesterol diet - Discussed symptoms of hyperglycemia and hypoglycemia - Discussed ADA glucose goals - Discussed  HGB A1c and the effects of blood glucose on the level - Discussed Healthy eating, avoidance of concentrated sweets, and to include vegetables by at least 2 meals a day - Discussed regular exercise - Discussed importance of follow up physician visits take medications as advised. Rest, ice/heat massage therapy/myofascial  release no heavy lift or twisting avoid prolonged positioning. We reviewed proper lifting mechanics continue with a home stretching program. Return to office in 2 weeks  Avoid tomato products, mint, citrus (drinks / fruit), fried fatty foods, caffeine, chocolate.Avoid food / drink ,2 hours prior to bedtime / napping:  Advised to take 1500mg per day of elemental calcium and 1,000 iu of vitamin D3, as well as a minimum of 3 hours per week of weight baring exercise.Repeat Bone Density in 2 years, as protocol indicates:  - Discussed diabetes physiology - Discussed importance of monitoring blood glucose levels - Encouraged a low fat/low cholesterol diet - Discussed symptoms of hyperglycemia and hypoglycemia - Discussed ADA glucose goals - Discussed  HGB A1c and the effects of blood glucose on the level - Discussed Healthy eating, avoidance of concentrated sweets, and to include vegetables by at least 2 meals a day - Discussed regular exercise - Discussed importance of follow up physician visits diet and exercise weight loss.  Low-salt low-fat ADA diet/ htn- Discussed diabetes physiology - Discussed importance of monitoring blood glucose levels - Encouraged a low fat/low cholesterol diet - Discussed symptoms of hyperglycemia and hypoglycemia - Discussed ADA glucose goals - Discussed  HGB A1c and the effects of blood glucose on the level - Discussed Healthy eating, avoidance of concentrated sweets, and to include vegetables by at least 2 meals a day - Discussed regular exercise - Discussed importance of follow up physician visits Limit intake of Sodium (Salt) to less than 2 grams a day to prevent fluid retention-swelling or worsening of symptoms. The importance of keeping the blood pressure at or below 130/80 to prevent stroke, heart attacks, kidney failure, blindness, and loss of limbs was  low chol diet. Avoid fried foods, red meat, butter, eggs, hard cheeses. Use canola or olive oil preferred. ::  was established in which goals would be set, monitoring would be done, and problem solving would also be addressed. The patient would be assisted using behavior change techniques, such as self-help and counseling through behavioral modification: Problem solving using hypnosis and positive medical reinforcement to achieve agreed-upon goals.  low chol diet. Avoid fried foods, red meat, butter, eggs, hard cheeses. Use canola or olive oil preferred.   - Encouraged a low fat/low cholesterol diet  - Discussed Healthy eating, avoidance of concentrated sweets, and to include vegetables by at least 3 meals a day  - encouraged low glycemic fruits, grains and vegetables and a diet high in plant protein  - Discussed regular exercise  - Discussed importance of follow up physician visits

## 2025-06-28 NOTE — HEALTH RISK ASSESSMENT
[Fair] :  ~his/her~ mood as fair [No] : No [No falls in past year] : Patient reported no falls in the past year [0] : 2) Feeling down, depressed, or hopeless: Not at all (0) [Reviewed no changes] : Reviewed, no changes [Designated Healthcare Proxy] : Designated healthcare proxy [Name: ___] : Health Care Proxy's Name: [unfilled]  [Relationship: ___] : Relationship: [unfilled] [Aggressive treatment] : aggressive treatment [I will adhere to the patient's wishes.] : I will adhere to the patient's wishes. [Time Spent: ___ minutes] : Time Spent: [unfilled] minutes [Never] : Never [None] : None [With Significant Other] : lives with significant other [Retired] : retired [Less Than High School] : less than high school [] :  [# Of Children ___] : has [unfilled] children [Feels Safe at Home] : Feels safe at home [Fully functional (bathing, dressing, toileting, transferring, walking, feeding)] : Fully functional (bathing, dressing, toileting, transferring, walking, feeding) [Fully functional (using the telephone, shopping, preparing meals, housekeeping, doing laundry, using] : Fully functional and needs no help or supervision to perform IADLs (using the telephone, shopping, preparing meals, housekeeping, doing laundry, using transportation, managing medications and managing finances) [Reports normal functional visual acuity (ie: able to read med bottle)] : Reports normal functional visual acuity [Smoke Detector] : smoke detector [Carbon Monoxide Detector] : carbon monoxide detector [Safety elements used in home] : safety elements used in home [Seat Belt] :  uses seat belt [Sunscreen] : uses sunscreen [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 [de-identified] : Minimal [de-identified] : Standard [Change in mental status noted] : No change in mental status noted [Language] : denies difficulty with language [Behavior] : denies difficulty with behavior [Learning/Retaining New Information] : denies difficulty learning/retaining new information [Handling Complex Tasks] : denies difficulty handling complex tasks [Reasoning] : denies difficulty with reasoning [Spatial Ability and Orientation] : denies difficulty with spatial ability and orientation [Sexually Active] : not sexually active [Reports changes in hearing] : Reports no changes in hearing [Reports changes in vision] : Reports no changes in vision [Reports changes in dental health] : Reports no changes in dental health [Travel to Developing Areas] : does not  travel to developing areas [TB Exposure] : is not being exposed to tuberculosis [Caregiver Concerns] : does not have caregiver concerns [MammogramComments] : Up to date

## 2025-06-28 NOTE — HEALTH RISK ASSESSMENT
[Fair] :  ~his/her~ mood as fair [No] : No [No falls in past year] : Patient reported no falls in the past year [0] : 2) Feeling down, depressed, or hopeless: Not at all (0) [Reviewed no changes] : Reviewed, no changes [Designated Healthcare Proxy] : Designated healthcare proxy [Name: ___] : Health Care Proxy's Name: [unfilled]  [Relationship: ___] : Relationship: [unfilled] [Aggressive treatment] : aggressive treatment [I will adhere to the patient's wishes.] : I will adhere to the patient's wishes. [Time Spent: ___ minutes] : Time Spent: [unfilled] minutes [Never] : Never [None] : None [With Significant Other] : lives with significant other [Retired] : retired [Less Than High School] : less than high school [] :  [# Of Children ___] : has [unfilled] children [Feels Safe at Home] : Feels safe at home [Fully functional (bathing, dressing, toileting, transferring, walking, feeding)] : Fully functional (bathing, dressing, toileting, transferring, walking, feeding) [Fully functional (using the telephone, shopping, preparing meals, housekeeping, doing laundry, using] : Fully functional and needs no help or supervision to perform IADLs (using the telephone, shopping, preparing meals, housekeeping, doing laundry, using transportation, managing medications and managing finances) [Reports normal functional visual acuity (ie: able to read med bottle)] : Reports normal functional visual acuity [Smoke Detector] : smoke detector [Carbon Monoxide Detector] : carbon monoxide detector [Safety elements used in home] : safety elements used in home [Seat Belt] :  uses seat belt [Sunscreen] : uses sunscreen [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 [de-identified] : Minimal [de-identified] : Standard [Change in mental status noted] : No change in mental status noted [Language] : denies difficulty with language [Behavior] : denies difficulty with behavior [Learning/Retaining New Information] : denies difficulty learning/retaining new information [Handling Complex Tasks] : denies difficulty handling complex tasks [Reasoning] : denies difficulty with reasoning [Spatial Ability and Orientation] : denies difficulty with spatial ability and orientation [Sexually Active] : not sexually active [Reports changes in hearing] : Reports no changes in hearing [Reports changes in vision] : Reports no changes in vision [Reports changes in dental health] : Reports no changes in dental health [Travel to Developing Areas] : does not  travel to developing areas [TB Exposure] : is not being exposed to tuberculosis [Caregiver Concerns] : does not have caregiver concerns [MammogramComments] : Up to date

## 2025-06-28 NOTE — HISTORY OF PRESENT ILLNESS
[Spouse] : spouse [de-identified] : 72 y/o F presents for a follow up. Pt has hx of htn, lipids, obesity, diabetes, covid-19 Hx of right shoulder pain, persists, post MRIL massive rotator cuff tear full thickness discontinuity of supraspinatus, infraspinatus and subscapularis. Significant edema within rotator cuff musculature. Bicep tendon complete tear of long head. Severe glenohumeral arthrosis and labrum tear.  Patient went to a breast specialist wants to biopsy.  Patient was told she has galactorrhea.  Patient has nonstop neck and back pain.  Voices no further complaints. ROS as documented below. Denies fever, cough, chills, body aches and SOB.  Or chest pain [FreeTextEntry1] : General health maintenance for neck pain back pain galactorrhea anxiety depression.  Her medications include Tylenol atorvastatin carvedilol duloxetine Jardiance metformin and meloxicam.

## 2025-06-28 NOTE — HISTORY OF PRESENT ILLNESS
[Spouse] : spouse [de-identified] : 72 y/o F presents for a follow up. Pt has hx of htn, lipids, obesity, diabetes, covid-19 Hx of right shoulder pain, persists, post MRIL massive rotator cuff tear full thickness discontinuity of supraspinatus, infraspinatus and subscapularis. Significant edema within rotator cuff musculature. Bicep tendon complete tear of long head. Severe glenohumeral arthrosis and labrum tear.  Patient went to a breast specialist wants to biopsy.  Patient was told she has galactorrhea.  Patient has nonstop neck and back pain.  Voices no further complaints. ROS as documented below. Denies fever, cough, chills, body aches and SOB.  Or chest pain [FreeTextEntry1] : General health maintenance for neck pain back pain galactorrhea anxiety depression.  Her medications include Tylenol atorvastatin carvedilol duloxetine Jardiance metformin and meloxicam.

## 2025-06-28 NOTE — PHYSICAL EXAM
[No Acute Distress] : no acute distress [Well Nourished] : well nourished [Well Developed] : well developed [Well-Appearing] : well-appearing [Normal Sclera/Conjunctiva] : normal sclera/conjunctiva [PERRL] : pupils equal round and reactive to light [EOMI] : extraocular movements intact [Normal Outer Ear/Nose] : the outer ears and nose were normal in appearance [Normal Oropharynx] : the oropharynx was normal [No JVD] : no jugular venous distention [No Lymphadenopathy] : no lymphadenopathy [Supple] : supple [Thyroid Normal, No Nodules] : the thyroid was normal and there were no nodules present [No Respiratory Distress] : no respiratory distress  [No Accessory Muscle Use] : no accessory muscle use [Clear to Auscultation] : lungs were clear to auscultation bilaterally [Normal Rate] : normal rate  [Regular Rhythm] : with a regular rhythm [Normal S1, S2] : normal S1 and S2 [No Murmur] : no murmur heard [No Carotid Bruits] : no carotid bruits [No Abdominal Bruit] : a ~M bruit was not heard ~T in the abdomen [No Varicosities] : no varicosities [Pedal Pulses Present] : the pedal pulses are present [No Edema] : there was no peripheral edema [No Palpable Aorta] : no palpable aorta [No Extremity Clubbing/Cyanosis] : no extremity clubbing/cyanosis [Declined Breast Exam] : declined breast exam  [Soft] : abdomen soft [Non Tender] : non-tender [Non-distended] : non-distended [No Masses] : no abdominal mass palpated [No HSM] : no HSM [Normal Bowel Sounds] : normal bowel sounds [Declined Rectal Exam] : declined rectal exam [Normal Posterior Cervical Nodes] : no posterior cervical lymphadenopathy [Normal Anterior Cervical Nodes] : no anterior cervical lymphadenopathy [No CVA Tenderness] : no CVA  tenderness [No Spinal Tenderness] : no spinal tenderness [No Joint Swelling] : no joint swelling [Grossly Normal Strength/Tone] : grossly normal strength/tone [No Rash] : no rash [Coordination Grossly Intact] : coordination grossly intact [No Focal Deficits] : no focal deficits [Normal Gait] : normal gait [Deep Tendon Reflexes (DTR)] : deep tendon reflexes were 2+ and symmetric [Normal Affect] : the affect was normal [Normal Insight/Judgement] : insight and judgment were intact [Normal] : affect was normal and insight and judgment were intact [Alert and Oriented x3] : oriented to person, place, and time [de-identified] : BMI 34 [de-identified] : Spurling positive stiffness pain right side [de-identified] : Admits to galactorrhea [de-identified] : No flank pain [de-identified] : Patient has low back pain neck pain Spurling positive straight leg raise positive bilaterally. [de-identified] : Anxious appears mildly depressed

## 2025-07-09 NOTE — PHYSICAL EXAM
[FreeTextEntry1] : Pleasant, in no distress. Language: English HEENT: Head: no trauma. Eyes: no discharge. Ears: No discharge. Nose No discharge. Throat: clear Neck: FAROM. Negative Spurlings Heart: RR, +S1, S2 Lungs: CTA Abdomen: soft, NT Lumbar spine: AROM 0-70., Bilateral lumbar paraspinal muscle spasms.  Tender across the sacrum.  LUE: Shoulder:AROM, 0-90 positive crepitus MS 4/5 Elbow: FAROM, MS 4/5 reflexes 2/4 Wrist: FAROM, MS 4/5 reflexes 2/4 Warm, nontender, pulse 2+  RUE: Shoulder:AROM, 0-90 positive crepitus MS 4/5 Elbow: FAROM, MS 4/5 reflexes 2/4 Wrist: FAROM, MS 4/5 reflexes 2/4 Warm, nontender, pulse 2+  LLE: Hip: FAROM, MS 4/5 t tender on FA IR of the left hip.  Nontender in the iliotibial band.  Nontender over the SI joint Knee: FAROM, positive crepitus at the knees MS 4/5 reflexes 2/4 Ankle: FAROM, MS 4/5 reflexes 2/4 Warm , nontender, pulse 2+ negative homans  RLE:Hip: FAROM, MS 4/5 tender palpation of the lateral hip bursa Knee: FAROM, positive crepitus at the knees MS 4/5 reflexes 2/4 Ankle: FAROM, MS 4/5 reflexes 2/4 Warm , nontender, pulse 2+ negative homans  Gait: Spontaneous, reciprocal, safe without an assistive device.  The patient ambulates with a flexed trunk  Sensation RUE: sensation is intact to light touch, pinprick  and proprioception LUE: sensation is intact to light touch, pinprick  and proprioception RLE: sensation is intact to light touch, pinprick  and proprioception. Neg SLR. Neg ALEXA, Neg FADIR LLE: sensation is intact to light touch, pinprick  and proprioception. Neg SLR. Neg ALEXA, Neg FADIR

## 2025-07-09 NOTE — DATA REVIEWED
[MRI] : MRI [Other: ___] : [unfilled] [FreeTextEntry1] : Blood work BUNs/CR: 18/0.96  Patient should be able to tolerate NSAIDs Hemoglobin A1c: 8.1% I should avoid using a Medrol Dosepak in this patient to treat inflammation  MRI of the lumbar spine performed on May 30, 2024 reveals L1/2 mild desiccation without disc protrusion L2/3 circumferential disc bulge.  Slightly more focal bilateral foraminal disc protrusions, left greater than right.  Mild to moderate facet arthrosis.  Ligamentum flavum thickening.  Moderate to severe thecal sac compression.  There is severe compression of the left L3 nerve root origin.  There is severe left foraminal stenosis with compression of the exiting nerve root.  There is moderate right foraminal stenosis with abutment of the exiting nerve root. At L3/4 there is small circumferential disc bulge.  There is slightly more focal disc protrusion.  There is moderate facet arthrosis.  There is severe thecal sac compression.  Probably due to epidural lipomatosis.  There is moderate bilateral foraminal stenosis.  There is abutment of the exiting nerve roots. At L4/5 there is circumferential disc bulge.  There is severe facet arthrosis.  There is moderate ligamentum flavum   thickening.  There is moderate to severe thecal sac compression.  There is moderate bilateral foraminal stenosis.  There is abutment of the exiting nerve roots At L4/5 posterior disc protrusion eccentric to the right and superimposed on a disc bulge.  Osteophytic ridging.  Extends to both neuroforamen.  There is moderate to severe facet arthrosis.  There is moderate to severe thecal sac compression.  There is severe left foraminal stenosis with compression of the exiting nerve root.  There is moderate to severe right foraminal stenosis with impingement of exiting nerve root.  EMG/NCV BLE 7/06/2024 Bilateral S1 radiculopathy

## 2025-07-09 NOTE — HISTORY OF PRESENT ILLNESS
[FreeTextEntry1] : 09/23/2023  73year old female presents with a new complaint of low back pain  Left pyriformis  Low back pain Pain starts in the left buttock and radiates to the lateral thigh and down the back of the thigh to the knee.  The pain rarely radiates below the knee.  The pain is worse with standing/weightbearing through the leg and sitting.  She denies bowel bladder difficulties.  She denies leg weakness.  MRI of the lumbar spine was reviewed with the patient. The EMG/NCV of the lower extremities was reviewed with the patient  Based on these exams I would think her pain should radiate to the foot.  The pain stops at the knee

## 2025-07-09 NOTE — ASSESSMENT
[FreeTextEntry1] : Preliminary report  Impression: Bilateral S1 radiculopathy  Recommendations: Continue restorative physical therapy. If her pain persist she would benefit from lumbar epidural blocks.  Full report to follow